# Patient Record
Sex: FEMALE | Race: WHITE | HISPANIC OR LATINO | URBAN - METROPOLITAN AREA
[De-identification: names, ages, dates, MRNs, and addresses within clinical notes are randomized per-mention and may not be internally consistent; named-entity substitution may affect disease eponyms.]

---

## 2024-10-07 ENCOUNTER — EMERGENCY (EMERGENCY)
Facility: HOSPITAL | Age: 54
LOS: 1 days | Discharge: HOME | End: 2024-10-07
Attending: EMERGENCY MEDICINE | Admitting: EMERGENCY MEDICINE
Payer: MEDICAID

## 2024-10-07 VITALS
RESPIRATION RATE: 19 BRPM | DIASTOLIC BLOOD PRESSURE: 70 MMHG | HEART RATE: 78 BPM | TEMPERATURE: 98 F | OXYGEN SATURATION: 98 % | SYSTOLIC BLOOD PRESSURE: 112 MMHG

## 2024-10-07 VITALS
DIASTOLIC BLOOD PRESSURE: 81 MMHG | OXYGEN SATURATION: 97 % | SYSTOLIC BLOOD PRESSURE: 114 MMHG | HEART RATE: 85 BPM | TEMPERATURE: 97 F | WEIGHT: 105.82 LBS | RESPIRATION RATE: 18 BRPM

## 2024-10-07 PROCEDURE — 72125 CT NECK SPINE W/O DYE: CPT | Mod: 26,MC

## 2024-10-07 PROCEDURE — 70450 CT HEAD/BRAIN W/O DYE: CPT | Mod: 26,MC

## 2024-10-07 PROCEDURE — 99285 EMERGENCY DEPT VISIT HI MDM: CPT

## 2024-10-07 PROCEDURE — 70486 CT MAXILLOFACIAL W/O DYE: CPT | Mod: 26,MC

## 2024-10-07 RX ORDER — ACETAMINOPHEN 325 MG
975 TABLET ORAL ONCE
Refills: 0 | Status: COMPLETED | OUTPATIENT
Start: 2024-10-07 | End: 2024-10-07

## 2024-10-07 RX ORDER — CYCLOBENZAPRINE HCL 10 MG
1 TABLET ORAL
Qty: 21 | Refills: 0
Start: 2024-10-07 | End: 2024-10-13

## 2024-10-07 RX ORDER — LIDOCAINE 50 MG/G
1 CREAM TOPICAL
Qty: 7 | Refills: 0
Start: 2024-10-07 | End: 2024-10-13

## 2024-10-07 RX ORDER — LIDOCAINE 50 MG/G
1 CREAM TOPICAL ONCE
Refills: 0 | Status: COMPLETED | OUTPATIENT
Start: 2024-10-07 | End: 2024-10-07

## 2024-10-07 RX ADMIN — Medication 975 MILLIGRAM(S): at 15:49

## 2024-10-07 RX ADMIN — LIDOCAINE 1 PATCH: 50 CREAM TOPICAL at 15:49

## 2024-10-07 RX ADMIN — Medication 1500 MILLIGRAM(S): at 15:49

## 2024-10-07 NOTE — ED PROVIDER NOTE - PATIENT PORTAL LINK FT
You can access the FollowMyHealth Patient Portal offered by [facility] by registering at the following website: [email or web address]. By joining [employer]’s FollowMyHealth portal, you will also be able to view your health information using other applications (apps) compatible with our system.

## 2024-10-07 NOTE — ED PROVIDER NOTE - NSFOLLOWUPINSTRUCTIONS_ED_ALL_ED_FT
PORFACOR RONAK ULISES CON DOCTOR JESSICA ESPECIALISTA DE LA COLUMNA.     Subluxación cervical  Cervical Subluxation  A side view of a person's upper body showing the skull, cervical vertebrae, and spinal cord.   Juan subluxación cervical es la separación de los huesos del lloyd (vértebras cervicales). Se produce debido a juan lesión en los ligamentos que mantienen juntas las vértebras. Las vértebras cervicales se componen de siete huesos. Estas vértebras sostienen la reginald y protegen la médula marin en miles paso por el lloyd.    ¿Cuáles son las causas?  La causa de esta afección puede ser juan lesión o un traumatismo que sucede con un impacto brusco. Suele producirse cuando el lloyd se hiperextiende. Puede deberse a lo siguiente:  Un accidente automovilístico. Esta es la causa más frecuente de esta afección.  Juan lesión deportiva.  Juan caída.  Un ataque físico violento.  Otras causas son:  Tener artritis.  Tener kerry postura jayna un leighton período (crónica).  ¿Cuáles son los signos o síntomas?  Los síntomas varían en función del ángulo de separación de las vértebras cervicales. Algunos de los síntomas son los siguientes:  Dolor o sensibilidad del lloyd.  Dolor al  el lloyd o dificultad para  el lloyd.  Ashley de reginald.  Rigidez en el lloyd.  Espasmos musculares del lloyd.  Mareos.  Un cambio en la posición del lloyd.  Cuando hay lesiones en la médula marin y las raíces nerviosas del lloyd, los síntomas también pueden incluir lo siguiente:  Debilidad y adormecimiento del brazo o de la pierna.  Debilidad en prensión de las matheus. Debido a esto, es posible que se le caigan las cosas al sujetarlas.  Kerry caligrafía.  Caerse o tener problemas para mantener el equilibrio.  Pérdida del control de la vejiga o los intestinos (incontinencia).  ¿Cómo se diagnostica?  Esta afección se diagnostica mediante los antecedentes médicos, un examen físico y estudios de diagnóstico por imágenes. Las pruebas pueden incluir las siguientes:  Radiografías.  Juan exploración por tomografía computarizada (TC).  Juan resonancia magnética (RM).  ¿Cómo se trata?  El tratamiento de esta afección depende de la gravedad. El tratamiento puede implicar lo siguiente:  Hacer reposo.  El uso de un collarín o un anillo para apoyar el lloyd. Samreen limitará los movimientos del lloyd.  Medicamentos para aliviar el dolor y reducir la hinchazón.  Reducción cerrada. Es un método de manipulación física para realinear las vértebras cervicales sin tener que realizar juan cirugía.  Tracción esquelética. Se utilizan pesos, poleas y cuerdas para ejercer juan fuerza de tracción y realinear las vértebras cervicales. Puede utilizarse antes de la cirugía.  Cirugía para poner las vértebras cervicales nuevamente en miles lugar. La cirugía es necesaria en los siguientes casos:  Las vértebras cervicales no permanecen en miles lugar y se mueven con facilidad (son inestables).  Los nervios están muy dañados en el área de las vértebras cervicales.  Hay juan lesión en la médula marin.  Hay acumulación de jamaica sobre la médula marin.  Un disco cervical comprime la médula marin.  Siga estas instrucciones en miles casa:  Si tiene un aparato ortopédico para el lloyd o collarín:    Use el aparato ortopédico para el lloyd o collarín rocio se lo haya indicado el médico. Quíteselo solamente rocio se lo haya indicado el médico.  Controle todos los flor la piel alrededor del aparato ortopédico o collarín. Informe al médico acerca de cualquier inquietud.  Mantenga el aparato ortopédico para el lloyd o collarín limpio y seco.  Pregúntele al médico cuándo puede volver a conducir si tiene un aparato ortopédico para el lloyd o collarín.  Si el aparato ortopédico o collarín no son impermeables:  No deje que se mojen.  Cúbralos con un envoltorio hermético cuando tome un baño de inmersión o juan ducha.  Actividad    A sign showing that a person should not lift anything heavy.  Ronak reposo y limite el movimiento del lloyd rocio se lo haya indicado el médico.  Limite la cantidad de actividad física que realiza rocio se lo haya indicado el médico.  Es posible que deba evitar levantar objetos. Pregúntele al médico cuánto peso puede levantar sin correr riesgos.  En howie de que le indiquen fisioterapia, ronak los ejercicios que le haya indicado el médico o el fisioterapeuta, si corresponde.  Instrucciones generales    Use los medicamentos de venta jairo y los recetados solamente rocio se lo haya indicado el médico.  Pregúntele al médico si el medicamento recetado:  Requiere que evite conducir o usar maquinaria.  Puede causarle estreñimiento. Es posible que tenga que chico estas medidas para prevenir o tratar el estreñimiento:  Chante suficiente líquido rocio para mantener la orina de color amarillo pálido.  Usar medicamentos recetados o de venta jairo.  Consumir alimentos ricos en fibra, rocio frijoles, cereales integrales, y frutas y verduras frescas.  Limitar el consumo de alimentos ricos en grasa y azúcares procesados, rocio los alimentos fritos o dulces.  No tome maco de inmersión, no nade ni use el jacuzzi hasta que el médico lo autorice. Pregúntele al médico si puede ducharse. Jorge vez solo le permitan darse maco de esponja.  Concurra a todas las visitas de seguimiento. Catron es importante para realizar pruebas de seguimiento y sondra cómo se está curando. El médico también determinará cuándo puede quitarse el aparato ortopédico.  Comuníquese con un médico si:  El dolor persiste y no mejora después de chico analgésicos.  Siente debilidad, entumecimiento u hormigueo en las piernas o los brazos y estos síntomas empeoran.  Tiene fiebre.  Solicite ayuda de inmediato si:  Siente súbitamente dolor en el lloyd.  Siente debilidad intensa, entumecimiento u hormigueo de manera repentina en los brazos o en las piernas.  Tiene dificultad para caminar o se  sin motivo.  Siente un dolor de reginald intenso que no se jamil.  No puede controlar los intestinos o la vejiga.  Tiene dificultad para respirar.  Estos síntomas pueden indicar juan emergencia. Solicite ayuda de inmediato. Llame al 911.  No espere a sondra si los síntomas desaparecen.  No conduzca por gerry propios medios hasta el hospital.  Resumen  Juan subluxación cervical es la separación de los huesos del lloyd (vértebras cervicales). Se produce debido a juan lesión en los ligamentos que mantienen juntas las vértebras.  El tratamiento de esta afección depende de la gravedad.  Hay muchas opciones de tratamiento, rocio hacer reposo, usar un aparato ortopédico para el lloyd o collarín, chico analgésicos y realizar manipulación física para realinear las vértebras cervicales.  Si tiene un aparato ortopédico para el lloyd o collarín, úselo rocio se lo haya indicado el médico.  Solicite ayuda de inmediato si súbitamente siente un dolor intenso en el lloyd o siente debilidad intensa, entumecimiento u hormigueo en los brazos o en las piernas.  Esta información no tiene rocio fin reemplazar el consejo del médico. Asegúrese de hacerle al médico cualquier pregunta que tenga.

## 2024-10-07 NOTE — ED PROVIDER NOTE - NS ED ATTENDING STATEMENT MOD
I have seen and examined this patient and fully participated in the care of this patient as the teaching attending.  The service was shared with the SHY.  I reviewed and verified the documentation.

## 2024-10-07 NOTE — ED PROVIDER NOTE - CARE PROVIDER_API CALL
Asher Muller Malick  Orthopaedic Surgery  [address] [place] [address], [address]  [place] [address]  Phone: [phone]  Fax: [phone]  Follow Up Time:

## 2024-10-07 NOTE — ED PROVIDER NOTE - ATTENDING CONTRIBUTION TO CARE
Patient was endorsed to me and presented by EARL Downing.  Patient was seen at bedside and examined she has midline tenderness to palpation of the C-spine.  She has no focal neurological deficits such as weakness paresthesias or numbness.  5 out of 5 motor strength to upper and lower extremities with normal gait and otherwise normal cranial nerves and normal neurological exam.  I discussed the case with spine on-call Dr. Renzo Sterling who will see her in the office outpatient placed in c-collar and strict return precautions were discussed please see progress note

## 2024-10-07 NOTE — ED PROVIDER NOTE - PHYSICAL EXAMINATION
General: Well appearing in no acute distress, alert and cooperative  Head: Normocephalic, atraumatic. No palpable scalp hematomas or areas of scalp tenderness.   Eyes: PERRLA, no conjunctival injection, no scleral icterus, EOMI. No nystagmus b/l.   Neck: Soft and supple, full ROM without pain. +midline spinal tenderness to palpation over mid cervical spine, no step-offs. +right paraspinal cervical tenderness to palpation.   Cardiac: Regular rate and regular rhythm, no murmurs  Resp: Unlabored respiratory effort, lungs CTAB, speaking in full sentences, no wheezes  Abd: Soft, non-tender, non-distended, no guarding or rebound tenderness  MSK: Spine midline and non-tender. No paraspinal thoracic or lumbar tenderness to palpation.   Skin: Warm and dry, no rashes/abrasions/lacerations  Neuro: AO x 3, moves all extremities symmetrically, Motor strength 5/5 bilaterally UE and LE, sensation grossly intact. Normal gait. No ataxia - rapid alternating movements intact with finger to nose. Speech is fluent and appropriate.
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
## 2024-10-07 NOTE — ED ADULT NURSE REASSESSMENT NOTE - NS ED NURSE REASSESS COMMENT FT1
pt c/o fatigue and imbalance when walking.  used, provider with RN at bedside. pt verbalized understanding that sx are likely 2/2 muscle relaxer use.

## 2024-10-07 NOTE — ED ADULT NURSE NOTE - NSFALLUNIVINTERV_ED_ALL_ED
Bed/Stretcher in lowest position, wheels locked, appropriate side rails in place/Call bell, personal items and telephone in reach/Instruct patient to call for assistance before getting out of bed/chair/stretcher/Non-slip footwear applied when patient is off stretcher/[place] to call system/Physically safe environment - no spills, clutter or unnecessary equipment/Purposeful proactive rounding/Room/bathroom lighting operational, light cord in reach
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
## 2024-10-07 NOTE — ED ADULT NURSE REASSESSMENT NOTE - NS ED NURSE REASSESS COMMENT FT1
Soft c-collar placed  Pt dc and all education given and gone over with pt. Pt has no further questions and self ambulating out of ed with all belongings and ppwrk. Verbal confirmation that pt understands  All information including refferals, resources, recomendations and  medications (if applicable) gone over with pateint.  Pt not with any current complaints and reports feeling great,   Care complete
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
## 2024-10-07 NOTE — ED PROVIDER NOTE - PROGRESS NOTE DETAILS
ghazal I resumed care of patient she is Azeri-speaking only translation provided by me.  I explained all CT results the patient.  At this time she denies active paresthesias focal weakness she denies numbness.  I explained to her red flag signs of cervical spine symptoms such as focal weakness numbness or paresthesias she should return immediately to the emergency department.  She agrees with this plan.  I spoke with spine on-call Dr. Renzo Sterling and he recommends that patient be placed in a collar and he will follow-up with her outpatient.  Patient will be given Dr. Sterling's follow-up information I gave her strict return precautions she should follow-up within a week or less.  She agrees with plan all questions were answered she will be given medications for pain control.
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
## 2024-10-11 DIAGNOSIS — Z79.82 LONG TERM (CURRENT) USE OF ASPIRIN: ICD-10-CM

## 2024-10-11 DIAGNOSIS — E78.5 HYPERLIPIDEMIA, UNSPECIFIED: ICD-10-CM

## 2024-10-11 DIAGNOSIS — M54.2 CERVICALGIA: ICD-10-CM

## 2024-10-11 DIAGNOSIS — Z79.02 LONG TERM (CURRENT) USE OF ANTITHROMBOTICS/ANTIPLATELETS: ICD-10-CM

## 2024-10-11 DIAGNOSIS — I25.10 ATHEROSCLEROTIC HEART DISEASE OF NATIVE CORONARY ARTERY WITHOUT ANGINA PECTORIS: ICD-10-CM

## 2024-10-11 DIAGNOSIS — S02.2XXA FRACTURE OF NASAL BONES, INITIAL ENCOUNTER FOR CLOSED FRACTURE: ICD-10-CM

## 2024-10-11 DIAGNOSIS — M43.12 SPONDYLOLISTHESIS, CERVICAL REGION: ICD-10-CM

## 2024-10-11 DIAGNOSIS — Z95.5 PRESENCE OF CORONARY ANGIOPLASTY IMPLANT AND GRAFT: ICD-10-CM

## 2024-10-11 DIAGNOSIS — Z88.0 ALLERGY STATUS TO PENICILLIN: ICD-10-CM
